# Patient Record
Sex: MALE | Employment: FULL TIME | URBAN - METROPOLITAN AREA
[De-identification: names, ages, dates, MRNs, and addresses within clinical notes are randomized per-mention and may not be internally consistent; named-entity substitution may affect disease eponyms.]

---

## 2022-03-08 ENCOUNTER — HOSPITAL ENCOUNTER (EMERGENCY)
Age: 67
Discharge: HOME | End: 2022-03-08
Attending: EMERGENCY MEDICINE
Payer: COMMERCIAL

## 2022-03-08 VITALS
DIASTOLIC BLOOD PRESSURE: 87 MMHG | RESPIRATION RATE: 17 BRPM | HEART RATE: 82 BPM | TEMPERATURE: 99.5 F | SYSTOLIC BLOOD PRESSURE: 140 MMHG | OXYGEN SATURATION: 97 %

## 2022-03-08 DIAGNOSIS — M10.072 ACUTE IDIOPATHIC GOUT OF LEFT FOOT: Primary | ICD-10-CM

## 2022-03-08 PROCEDURE — 99283 EMERGENCY DEPT VISIT LOW MDM: CPT

## 2022-03-08 RX ORDER — KETOROLAC TROMETHAMINE 30 MG/ML
30 INJECTION, SOLUTION INTRAMUSCULAR; INTRAVENOUS ONCE
Status: DISCONTINUED | OUTPATIENT
Start: 2022-03-08 | End: 2022-03-08 | Stop reason: HOSPADM

## 2022-03-08 RX ORDER — INDOMETHACIN 50 MG/1
50 CAPSULE ORAL 2 TIMES DAILY WITH MEALS
Qty: 14 CAPSULE | Refills: 0 | Status: SHIPPED | OUTPATIENT
Start: 2022-03-08

## 2022-03-08 ASSESSMENT — PAIN DESCRIPTION - ORIENTATION: ORIENTATION: LEFT

## 2022-03-08 ASSESSMENT — PAIN DESCRIPTION - LOCATION: LOCATION: KNEE;FOOT

## 2022-03-08 ASSESSMENT — ENCOUNTER SYMPTOMS
ABDOMINAL PAIN: 0
SHORTNESS OF BREATH: 0
APNEA: 0
VOICE CHANGE: 0
EYES NEGATIVE: 1
ABDOMINAL DISTENTION: 0
SORE THROAT: 0

## 2022-03-08 ASSESSMENT — PAIN - FUNCTIONAL ASSESSMENT: PAIN_FUNCTIONAL_ASSESSMENT: 0-10

## 2022-03-08 ASSESSMENT — PAIN SCALES - GENERAL: PAINLEVEL_OUTOF10: 5

## 2022-03-08 NOTE — ED PROVIDER NOTES
[facility]     Emergency Department     Faculty Attestation    I performed a history and physical examination of the patient and discussed management with the resident. I reviewed the residents note and agree with the documented findings and plan of care. Any areas of disagreement are noted on the chart. I was personally present for the key portions of any procedures. I have documented in the chart those procedures where I was not present during the key portions. I have reviewed the emergency nurses triage note. I agree with the chief complaint, past medical history, past surgical history, allergies, medications, social and family history as documented unless otherwise noted below. For Physician Assistant/ Nurse Practitioner cases/documentation I have personally evaluated this patient and have completed at least one if not all key elements of the E/M (history, physical exam, and MDM). Additional findings are as noted. I have personally seen and evaluated the patient. I find the patient's history and physical exam are consistent with the NP/PA documentation. I agree with the care provided, treatment rendered, disposition and follow-up plan. Patient reporting swelling of the left ankle and knee with a history of gout he does shortness swelling of the knee and foot both appear to be consistent with gout      Critical Care     Montrell Vargas M.D.   Attending Emergency  Physician              Ayan Mcmillan MD  03/08/22 2440

## 2022-03-08 NOTE — ED PROVIDER NOTES
[facility]  ED  Emergency Department Encounter  Emergency Medicine Resident     Pt Name: Todd Alaniz  LHN:[id number]  [place] 1955  Date of evaluation: 3/8/22  PCP:  No primary care provider on file. CHIEF COMPLAINT       Chief Complaint   Patient presents with    Foot Swelling     left since last week denies any injury    Joint Swelling     knee since saturday       HISTORY OF PRESENT ILLNESS  (Location/Symptom, Timing/Onset, Context/Setting, Quality, Duration, ModifyingFactors, Severity.)      Todd Alaniz is a 73 y.o. male with PMH of gouty flare presents emerge department for evaluation of left knee and foot swelling. Patient states that his left foot and swelling since last week in his knee since his last Saturday. Denies any injury. States that his does have a high gout flareup that he had approximately 3 years felt. Patient states that he does not have flares often and states that normally he does very well without having any other problems. States that he still able to ambulate and walk although it is painful and that his pain has been decently controlled with Motrin. PAST MEDICAL / SURGICAL / SOCIAL /FAMILY HISTORY      has no past medical history on file. No other pertinent PMH on review with patient/guardian. has no past surgical history on file. No other pertinent PSH on review with patient/guardian.   Social History     Socioeconomic History    Marital status:      Spouse name: Not on file    Number of children: Not on file    Years of education: Not on file    Highest education level: Not on file   Occupational History    Not on file   Tobacco Use    Smoking status: Not on file    Smokeless tobacco: Not on file   Substance and Sexual Activity    Alcohol use: Not on file    Drug use: Not on file    Sexual activity: Not on file   Other Topics Concern    Not on file   Social History Narrative    Not on file     Social Determinants of Health Financial Resource Strain:     Difficulty of Paying Living Expenses: Not on file   Food Insecurity:     Worried About Running Out of Food in the Last Year: Not on file    Viktor of Food in the Last Year: Not on file   Transportation Needs:     Lack of Transportation (Medical): Not on file    Lack of Transportation (Non-Medical): Not on file   Physical Activity:     Days of Exercise per Week: Not on file    Minutes of Exercise per Session: Not on file   Stress:     Feeling of Stress : Not on file   Social Connections:     Frequency of Communication with Friends and Family: Not on file    Frequency of Social Gatherings with Friends and Family: Not on file    Attends Oriental orthodox Services: Not on file    Active Member of [address] [place] [address] [employer] or Organizations: Not on file    Attends Club or Organization Meetings: Not on file    Marital Status: Not on file   Intimate Partner Violence:     Fear of Current or Ex-Partner: Not on file    Emotionally Abused: Not on file    Physically Abused: Not on file    Sexually Abused: Not on file   Housing Stability:     Unable to Pay for Housing in the Last Year: Not on file    Number of [place] in the Last Year: Not on file    Unstable Housing in the Last Year: Not on file       I counseled the patient against using tobacco products. No family history on file. No other pertinent FamHx on review with patient/guardian. Allergies:  Patient has no known allergies. Home Medications:  Prior to Admission medications    Medication Sig Start Date End Date Taking? Authorizing Provider   indomethacin (INDOCIN) 50 MG capsule Take 1 capsule by mouth 2 times daily (with meals) 3/8/22  Yes Keturah Manjarrez MD       REVIEW OF SYSTEMS    (2-9 systems for level 4, 10 ormore for level 5)      Review of Systems   Constitutional: Negative for activity change, appetite change and fatigue. HENT: Negative for congestion, sore throat and voice change. Eyes: Negative.     Respiratory: Negative for apnea and shortness of breath. Cardiovascular: Negative for chest pain. Gastrointestinal: Negative for abdominal distention and abdominal pain. Genitourinary: Negative for difficulty urinating and urgency. Musculoskeletal: Positive for joint swelling. Skin: Negative. Neurological: Negative. Psychiatric/Behavioral: Negative. PHYSICAL EXAM   (up to 7 for level 4, 8 or more for level 5)      INITIAL VITALS:   BP (!) 140/87   Pulse 82   Temp 99.5 °F (37.5 °C) (Oral)   Resp 17   SpO2 97%     Physical Exam  Constitutional:       Appearance: Normal appearance. HENT:      Head: Normocephalic and atraumatic. Nose: Nose normal.      Mouth/Throat:      Mouth: Mucous membranes are moist.      Pharynx: Oropharynx is clear. Eyes:      Extraocular Movements: Extraocular movements intact. Conjunctiva/sclera: Conjunctivae normal.      Pupils: Pupils are equal, round, and reactive to light. Cardiovascular:      Rate and Rhythm: Normal rate and regular rhythm. Pulses: Normal pulses. Heart sounds: Normal heart sounds. Musculoskeletal:      Cervical back: Normal range of motion and neck supple. Comments: Mildly swollen and tender left knee with no active area of erythema or point fluctuance. Significantly swollen foot especially on the medial side, mild point tenderness, no overlying cellulitis, erythema or warmth. Skin:     General: Skin is warm and dry. Capillary Refill: Capillary refill takes less than 2 seconds. Neurological:      General: No focal deficit present. Mental Status: He is alert. Mental status is at baseline. Psychiatric:         Mood and Affect: Mood normal.         Thought Content: Thought content normal.         DIFFERENTIAL  DIAGNOSIS     DDX: Acute gouty flare     PLAN (LABS / IMAGING / EKG):  No orders of the defined types were placed in this encounter.       MEDICATIONS ORDERED:  Orders Placed This Encounter   Medications Medicine  03/08/22 4:45 PM        (Please note that portions of this note were completed with a voice recognition program.Efforts were made to edit the dictations but occasionally words are mis-transcribed.)        Carol Valentino MD  Resident  03/08/22 8957

## 2023-03-16 ENCOUNTER — HOSPITAL ENCOUNTER (EMERGENCY)
Age: 68
Discharge: LEFT BEFORE DISCHARGE | End: 2023-03-16
Attending: EMERGENCY MEDICINE
Payer: COMMERCIAL

## 2023-03-16 ENCOUNTER — APPOINTMENT (OUTPATIENT)
Dept: GENERAL RADIOLOGY | Age: 68
End: 2023-03-16
Payer: COMMERCIAL

## 2023-03-16 VITALS
SYSTOLIC BLOOD PRESSURE: 155 MMHG | OXYGEN SATURATION: 97 % | HEART RATE: 89 BPM | WEIGHT: 240 LBS | DIASTOLIC BLOOD PRESSURE: 94 MMHG | TEMPERATURE: 98.4 F | RESPIRATION RATE: 16 BRPM

## 2023-03-16 DIAGNOSIS — M79.89 SWELLING OF RIGHT HAND: Primary | ICD-10-CM

## 2023-03-16 LAB
ABSOLUTE EOS #: 0.26 K/UL (ref 0–0.44)
ABSOLUTE IMMATURE GRANULOCYTE: 0.03 K/UL (ref 0–0.3)
ABSOLUTE LYMPH #: 1.88 K/UL (ref 1.1–3.7)
ABSOLUTE MONO #: 0.51 K/UL (ref 0.1–1.2)
ANION GAP SERPL CALCULATED.3IONS-SCNC: 7 MMOL/L (ref 9–17)
BASOPHILS # BLD: 0 % (ref 0–2)
BASOPHILS ABSOLUTE: 0.03 K/UL (ref 0–0.2)
BUN SERPL-MCNC: 9 MG/DL (ref 8–23)
CALCIUM SERPL-MCNC: 8.9 MG/DL (ref 8.6–10.4)
CHLORIDE SERPL-SCNC: 101 MMOL/L (ref 98–107)
CO2 SERPL-SCNC: 28 MMOL/L (ref 20–31)
CREAT SERPL-MCNC: 0.92 MG/DL (ref 0.7–1.2)
CRP SERPL HS-MCNC: 31.3 MG/L (ref 0–5)
EOSINOPHILS RELATIVE PERCENT: 3 % (ref 1–4)
ERYTHROCYTE [SEDIMENTATION RATE] IN BLOOD BY WESTERGREN METHOD: 48 MM/HR (ref 0–20)
GFR SERPL CREATININE-BSD FRML MDRD: >60 ML/MIN/1.73M2
GLUCOSE SERPL-MCNC: 123 MG/DL (ref 70–99)
HCT VFR BLD AUTO: 43.7 % (ref 40.7–50.3)
HGB BLD-MCNC: 14.7 G/DL (ref 13–17)
IMMATURE GRANULOCYTES: 0 %
LYMPHOCYTES # BLD: 18 % (ref 24–43)
MCH RBC QN AUTO: 31.1 PG (ref 25.2–33.5)
MCHC RBC AUTO-ENTMCNC: 33.6 G/DL (ref 28.4–34.8)
MCV RBC AUTO: 92.6 FL (ref 82.6–102.9)
MONOCYTES # BLD: 5 % (ref 3–12)
NRBC AUTOMATED: 0 PER 100 WBC
PDW BLD-RTO: 14.3 % (ref 11.8–14.4)
PLATELET # BLD AUTO: 291 K/UL (ref 138–453)
PMV BLD AUTO: 10.1 FL (ref 8.1–13.5)
POTASSIUM SERPL-SCNC: 4.5 MMOL/L (ref 3.7–5.3)
RBC # BLD: 4.72 M/UL (ref 4.21–5.77)
SEG NEUTROPHILS: 74 % (ref 36–65)
SEGMENTED NEUTROPHILS ABSOLUTE COUNT: 7.57 K/UL (ref 1.5–8.1)
SODIUM SERPL-SCNC: 136 MMOL/L (ref 135–144)
WBC # BLD AUTO: 10.3 K/UL (ref 3.5–11.3)

## 2023-03-16 PROCEDURE — 85025 COMPLETE CBC W/AUTO DIFF WBC: CPT

## 2023-03-16 PROCEDURE — 2580000003 HC RX 258: Performed by: STUDENT IN AN ORGANIZED HEALTH CARE EDUCATION/TRAINING PROGRAM

## 2023-03-16 PROCEDURE — 6360000002 HC RX W HCPCS: Performed by: STUDENT IN AN ORGANIZED HEALTH CARE EDUCATION/TRAINING PROGRAM

## 2023-03-16 PROCEDURE — 6370000000 HC RX 637 (ALT 250 FOR IP): Performed by: STUDENT IN AN ORGANIZED HEALTH CARE EDUCATION/TRAINING PROGRAM

## 2023-03-16 PROCEDURE — 86140 C-REACTIVE PROTEIN: CPT

## 2023-03-16 PROCEDURE — 99284 EMERGENCY DEPT VISIT MOD MDM: CPT

## 2023-03-16 PROCEDURE — 80048 BASIC METABOLIC PNL TOTAL CA: CPT

## 2023-03-16 PROCEDURE — 73130 X-RAY EXAM OF HAND: CPT

## 2023-03-16 PROCEDURE — 85652 RBC SED RATE AUTOMATED: CPT

## 2023-03-16 PROCEDURE — 96365 THER/PROPH/DIAG IV INF INIT: CPT

## 2023-03-16 PROCEDURE — 84550 ASSAY OF BLOOD/URIC ACID: CPT

## 2023-03-16 RX ORDER — INDOMETHACIN 25 MG/1
25 CAPSULE ORAL ONCE
Status: COMPLETED | OUTPATIENT
Start: 2023-03-16 | End: 2023-03-16

## 2023-03-16 RX ADMIN — INDOMETHACIN 25 MG: 25 CAPSULE ORAL at 21:23

## 2023-03-16 RX ADMIN — SODIUM CHLORIDE 3000 MG: 900 INJECTION INTRAVENOUS at 21:26

## 2023-03-16 ASSESSMENT — ENCOUNTER SYMPTOMS
ABDOMINAL PAIN: 0
SHORTNESS OF BREATH: 0
EYE REDNESS: 0
NAUSEA: 0
DIARRHEA: 0
RHINORRHEA: 0
VOMITING: 0

## 2023-03-16 ASSESSMENT — PAIN DESCRIPTION - LOCATION: LOCATION: HAND

## 2023-03-16 ASSESSMENT — PAIN DESCRIPTION - DESCRIPTORS: DESCRIPTORS: DISCOMFORT;ACHING

## 2023-03-16 ASSESSMENT — PAIN SCALES - GENERAL: PAINLEVEL_OUTOF10: 5

## 2023-03-16 NOTE — ED PROVIDER NOTES
[facility]     Emergency Department     Faculty Note/ Attestation      Pt Name: Minna Mitchell                                       MRN: [id number]  [place] 1955  Date of evaluation: 3/16/2023    Patients PCP:    Husam Sims  I performed a history and physical examination of the patient and discussed management with the resident. I reviewed the residents note and agree with the documented findings and plan of care. Any areas of disagreement are noted on the chart. I was personally present for the key portions of any procedures. I have documented in the chart those procedures where I was not present during the key portions. I have reviewed the emergency nurses triage note. I agree with the chief complaint, past medical history, past surgical history, allergies, medications, social and family history as documented unless otherwise noted below. For Physician Assistant/ Nurse Practitioner cases/documentation I have personally evaluated this patient and have completed at least one if not all key elements of the E/M (history, physical exam, and MDM). Additional findings are as noted. Initial Screens:        [place] Coma Scale  Eye Opening: Spontaneous  Best Verbal Response: Oriented  Best Motor Response: Obeys commands  [place] Coma Scale Score: 15    Vitals:    Vitals:    03/16/23 1839 03/16/23 1840   BP:  (!) 155/94   Pulse: 89    Resp: 16    Temp: 98.4 °F (36.9 °C)    TempSrc: Oral    SpO2: 97%    Weight: 240 lb (108.9 kg)        CHIEF COMPLAINT       Chief Complaint   Patient presents with    Hand Injury     Swollen right hand x 1 week    Joint Swelling       The pt is a 79 YO M with 1 week of swelling in right elbow and now down into the hand. The pt notes it has been worse in the pointer finger and able to move extremities but the pt has no trauma no bites or injuries. Tries ibuprofen but no prior history other than gout but no medications for this. EMERGENCY DEPARTMENT COURSE:     -------------------------  BP: (!) 155/94, Temp: 98.4 °F (36.9 °C), Heart Rate: 89, Resp: 16  Patient has entire right hand swollen good range of motion of all digits with the exception of the pointer finger patient has significant pain along the flexor tendon patient has difficulty with motion with flexion slightly limited extension range of motion primarily flexion range of motion is limited slight increase in warmth of the hand    Comments  Medical Decision Making  Amount and/or Complexity of Data Reviewed  Labs: ordered. Decision-making details documented in ED Course. Radiology: ordered. Decision-making details documented in ED Course. Risk  Prescription drug management. X-ray will be obtained for possible fracture or injury however more concerning in this patient is a flexor tenosynovitis or infection of the hand patient will have labs and hand surgery consult      ED Course as of 03/17/23 0055   Thu Mar 16, 2023   2030 XR HAND RIGHT (MIN 3 VIEWS)  Soft tissue swelling but no bony involvement [CP]   2042 Sed Rate(!): 48 [CP]   2042 WBC: 10.3  No leukocytosis [CP]   2102 Spoke with Baltazar from Plastic surgery who recommended- Unison 3g iv q6, starting treatment for gout  [CP]   2110 Updated the patient on concerns for flexor tenosynovitis versus an gout and recommendations by plastic surgery for admission for IV antibiotics as well as to begin treatment for gout flareup while pending work-up. Patient states that he has some external fears that he needs to \"sort out\" and states that he cannot stay for admission at this time because he cannot leave his wife. Patient states he is worried about the security of his house and does not go further into detail. Did offer several options including assisting wife with transport back home versus potentially allowing wife to stay overnight with patient.   Patient states that it still does not help solve his security issues. Patient states that he would be agreeable to return to the emergency department. Did discuss risk of leaving [address] including progression of possible infection, worsening of symptoms, loss of life or limb. Patient is alert and oriented and does have decision-making capacity. He does express understanding of concerns of both myself and the consulting physicians. Patient states that he is agreeable to stay for 1 dose of IV antibiotics prior to leaving [facility]MA and plans to return tomorrow morning to the emergency department for reevaluation. [CP]   7512 Also notified Dr. Tuyet Savage of patient's decision to leave [facility] [CP]      ED Course User Index  [CP] Evelyne Cogan, MD Letha Brush, DO,, DO, RDMS.   Attending Emergency Physician          Christiane Wild DO  03/17/23 4422

## 2023-03-16 NOTE — ED PROVIDER NOTES
[facility]  ED  Emergency Department Encounter  Emergency Medicine Resident     Pt Name:Karl Garcia  MRN: [id number]  [place] 1955  Date of evaluation: 3/16/23  PCP:  Rada Boxer  Note Started: 7:13 PM EDT    CHIEF COMPLAINT       Chief Complaint   Patient presents with    Hand Injury     Swollen right hand x 1 week    Joint Swelling     HISTORY OF PRESENT ILLNESS  (Location/Symptom, Timing/Onset, Context/Setting, Quality, Duration, Modifying Factors, Severity.)      Marikay Gaucher is a 78 y.o. male who presents with right hand swelling since Friday 3/10/2023. No specific injury. States that he did have some swelling in the right elbow earlier this week, but this has since resolved. He has tried ibuprofen without improvement. He also took a dose of a left over antibiotic from wife without improvement yesterday. Patient does have remote history of gout, but states he only had one episode and hasn't been on medication for years. He denies any fever, chills. PAST MEDICAL / SURGICAL / SOCIAL / FAMILY HISTORY      has no past medical history on file. has no past surgical history on file.       Social History     Socioeconomic History    Marital status:      Spouse name: Not on file    Number of children: Not on file    Years of education: Not on file    Highest education level: Not on file   Occupational History    Not on file   Tobacco Use    Smoking status: Every Day     Types: Cigarettes    Smokeless tobacco: Never   Substance and Sexual Activity    Alcohol use: Not Currently    Drug use: Never    Sexual activity: Yes     Partners: Female   Other Topics Concern    Not on file   Social History Narrative    Not on file     Social Determinants of Health     Financial Resource Strain: Not on file   Food Insecurity: Not on file   Transportation Needs: Not on file   Physical Activity: Not on file   Stress: Not on file   Social Connections: Not on file   Intimate Partner Violence: Not on file   Housing Stability: Not on file     History reviewed. No pertinent family history. Allergies:  Patient has no known allergies. Home Medications:  Prior to Admission medications    Medication Sig Start Date End Date Taking? Authorizing Provider   indomethacin (INDOCIN) 50 MG capsule Take 1 capsule by mouth 2 times daily (with meals) 3/8/22   Zahira Lara MD     REVIEW OF SYSTEMS       Review of Systems   Constitutional:  Negative for fever. HENT:  Negative for congestion and rhinorrhea. Eyes:  Negative for redness. Respiratory:  Negative for shortness of breath. Cardiovascular:  Negative for chest pain. Gastrointestinal:  Negative for abdominal pain, diarrhea, nausea and vomiting. Genitourinary:  Negative for dysuria. Musculoskeletal:  Positive for arthralgias, joint swelling and myalgias. Skin:  Negative for wound. Neurological:  Negative for headaches. PHYSICAL EXAM      INITIAL VITALS:   BP (!) 155/94   Pulse 89   Temp 98.4 °F (36.9 °C) (Oral)   Resp 16   Wt 240 lb (108.9 kg)   SpO2 97%     Physical Exam  Constitutional:       General: He is not in acute distress. Appearance: Normal appearance. HENT:      Head: Normocephalic and atraumatic. Nose: Nose normal.      Mouth/Throat:      Mouth: Mucous membranes are moist.   Eyes:      Extraocular Movements: Extraocular movements intact. Pupils: Pupils are equal, round, and reactive to light. Cardiovascular:      Rate and Rhythm: Normal rate and regular rhythm. Pulses: Normal pulses. Heart sounds: Normal heart sounds. No murmur heard. Pulmonary:      Effort: Pulmonary effort is normal. No respiratory distress. Breath sounds: Normal breath sounds. No wheezing. Abdominal:      General: There is no distension. Palpations: Abdomen is soft. Tenderness: There is no abdominal tenderness. There is no guarding or rebound.    Musculoskeletal:      Cervical back: Normal range of motion. Right lower leg: No edema. Left lower leg: No edema. Comments: With extensive edema noted to the dorsal aspect of the right hand. There is also some minimal swelling over the MCP of the index and middle finger on the right hand on the palmar surface. Patient unable to perform range of motion with the right index finger however passive range of motion remains somewhat intact. The right hand is somewhat more warm compared to the left hand. Slight erythema noted over the right hand as well. Neurological:      General: No focal deficit present. Mental Status: He is alert and oriented to person, place, and time. DDX/DIAGNOSTIC RESULTS / EMERGENCY DEPARTMENT COURSE / MDM     Medical Decision Making  This is a 60-year-old male with worsening right hand pain and swelling since Friday of last week. The right hand is markedly swollen, mildly erythematous and with some slight increased warmth compared to the left hand. Unable to perform range of motion with the right index finger. There is concern for flexor tenosynovitis versus acute gout pathology. Plan for x-ray imaging. Consider lab work to assess for inflammatory markers and leukocytosis. Patient may benefit from plastic/hand surgery evaluation. Consider admission pending work-up and consultant recommendations. Amount and/or Complexity of Data Reviewed  Labs: ordered. Decision-making details documented in ED Course. Radiology: ordered. Decision-making details documented in ED Course. Risk  Prescription drug management. Decision regarding hospitalization.       EKG  Not indicated    All EKG's are interpreted by the Emergency Department Physician who either signs or Co-signs this chart in the absence of a cardiologist.    EMERGENCY DEPARTMENT COURSE:    ED Course as of 03/16/23 2343   Thu Mar 16, 2023   2030 XR HAND RIGHT (MIN 3 VIEWS)  Soft tissue swelling but no bony involvement [CP]   2042 Sed Rate(!): 48 [CP] 2042 WBC: 10.3  No leukocytosis [CP]   2102 Spoke with Baltazar from Plastic surgery who recommended- Unison 3g iv q6, starting treatment for gout  [CP]   2110 Updated the patient on concerns for flexor tenosynovitis versus an gout and recommendations by plastic surgery for admission for IV antibiotics as well as to begin treatment for gout flareup while pending work-up. Patient states that he has some external fears that he needs to \"sort out\" and states that he cannot stay for admission at this time because he cannot leave his wife. Patient states he is worried about the security of his house and does not go further into detail. Did offer several options including assisting wife with transport back home versus potentially allowing wife to stay overnight with patient. Patient states that it still does not help solve his security issues. Patient states that he would be agreeable to return to the emergency department. Did discuss risk of leaving [address] including progression of possible infection, worsening of symptoms, loss of life or limb. Patient is alert and oriented and does have decision-making capacity. He does express understanding of concerns of both myself and the consulting physicians. Patient states that he is agreeable to stay for 1 dose of IV antibiotics prior to leaving [place] and plans to return tomorrow morning to the emergency department for reevaluation. [CP]   2143 Also notified Dr. Rachael Ga of patient's decision to leave [facility] [CP]      ED Course User Index  [CP] Elbert Oviedo MD     PROCEDURES:  none    CONSULTS:  IP CONSULT TO PLASTIC SURGERY    CRITICAL CARE:  There was significant risk of life threatening deterioration of patient's condition requiring my direct management. Critical care time 10 minutes, excluding any documented procedures. FINAL IMPRESSION      1.  Swelling of right hand        DISPOSITION / PLAN     DISPOSITION [place] 03/16/2023 09:49:43 PM    PATIENT REFERRED TO:  [facility] ED  [address] [facility] [address]  [phone]  Go to   for hand surgery evaluation    DISCHARGE MEDICATIONS:  Discharge Medication List as of 3/16/2023  9:53 Kisha Dean MD  Emergency Medicine Resident    (Please note that portions of thisnote were completed with a voice recognition program.  Efforts were made to edit the dictations but occasionally words are mis-transcribed.)       Mylinda Dakins, MD  Resident  03/16/23 6018

## 2023-03-16 NOTE — ED TRIAGE NOTES
Pt states 1 week ago his right elbow started to swell up,   Since last Friday his hand started to swell up     Having trouble with holding objects , making a fist     History of gout   Denies any recent falls or trauma to the rigth elbow and hand area

## 2023-03-17 LAB — URATE SERPL-MCNC: 8.1 MG/DL (ref 3.4–7)

## 2023-03-17 NOTE — DISCHARGE INSTRUCTIONS
You were evaluated today in the emergency department for right hand swelling. There is some concern for potential infection of the hand which left untreated could lead to permanent loss of limb and/or loss of life. It was recommended that you be admitted to the hospital today for further treatment and evaluation by the hand surgery team.  You are choosing to leave the emergency department [address] You did receive 1 dose of antibiotic prior to leaving [address] This is not satisfactory treatment of the hand swelling and further work-up and treatment is needed. It is recommended that you return to the emergency department for reevaluation as soon as possible and for hand/plastic surgery to evaluate the hand swelling and to continue treatment.

## 2024-02-20 ENCOUNTER — HOSPITAL ENCOUNTER (EMERGENCY)
Age: 69
Discharge: HOME | End: 2024-02-20
Attending: EMERGENCY MEDICINE
Payer: COMMERCIAL

## 2024-02-20 ENCOUNTER — APPOINTMENT (OUTPATIENT)
Dept: GENERAL RADIOLOGY | Age: 69
End: 2024-02-20
Payer: COMMERCIAL

## 2024-02-20 VITALS
SYSTOLIC BLOOD PRESSURE: 141 MMHG | DIASTOLIC BLOOD PRESSURE: 103 MMHG | TEMPERATURE: 98.3 F | OXYGEN SATURATION: 97 % | BODY MASS INDEX: 34.1 KG/M2 | WEIGHT: 225 LBS | HEIGHT: 68 IN | HEART RATE: 95 BPM | RESPIRATION RATE: 20 BRPM

## 2024-02-20 DIAGNOSIS — L03.116 CELLULITIS OF LEFT LOWER EXTREMITY: ICD-10-CM

## 2024-02-20 DIAGNOSIS — M10.9 ACUTE GOUT OF LEFT FOOT, UNSPECIFIED CAUSE: Primary | ICD-10-CM

## 2024-02-20 LAB
ANION GAP SERPL CALCULATED.3IONS-SCNC: 8 MMOL/L (ref 9–17)
BASOPHILS # BLD: 0.03 K/UL (ref 0–0.2)
BASOPHILS NFR BLD: 0 % (ref 0–2)
BUN SERPL-MCNC: 10 MG/DL (ref 8–23)
CALCIUM SERPL-MCNC: 8.7 MG/DL (ref 8.6–10.4)
CHLORIDE SERPL-SCNC: 103 MMOL/L (ref 98–107)
CO2 SERPL-SCNC: 25 MMOL/L (ref 20–31)
CREAT SERPL-MCNC: 0.8 MG/DL (ref 0.7–1.2)
CRP SERPL HS-MCNC: 142.2 MG/L (ref 0–5)
EOSINOPHIL # BLD: 0.21 K/UL (ref 0–0.44)
EOSINOPHILS RELATIVE PERCENT: 2 % (ref 1–4)
ERYTHROCYTE [DISTWIDTH] IN BLOOD BY AUTOMATED COUNT: 15 % (ref 11.8–14.4)
ERYTHROCYTE [SEDIMENTATION RATE] IN BLOOD BY PHOTOMETRIC METHOD: 60 MM/HR (ref 0–20)
GFR SERPL CREATININE-BSD FRML MDRD: >60 ML/MIN/1.73M2
GLUCOSE SERPL-MCNC: 91 MG/DL (ref 70–99)
HCT VFR BLD AUTO: 45.2 % (ref 40.7–50.3)
HGB BLD-MCNC: 14.8 G/DL (ref 13–17)
IMM GRANULOCYTES # BLD AUTO: 0.05 K/UL (ref 0–0.3)
IMM GRANULOCYTES NFR BLD: 1 %
LYMPHOCYTES NFR BLD: 1.32 K/UL (ref 1.1–3.7)
LYMPHOCYTES RELATIVE PERCENT: 13 % (ref 24–43)
MCH RBC QN AUTO: 31.2 PG (ref 25.2–33.5)
MCHC RBC AUTO-ENTMCNC: 32.7 G/DL (ref 28.4–34.8)
MCV RBC AUTO: 95.4 FL (ref 82.6–102.9)
MONOCYTES NFR BLD: 0.77 K/UL (ref 0.1–1.2)
MONOCYTES NFR BLD: 7 % (ref 3–12)
NEUTROPHILS NFR BLD: 77 % (ref 36–65)
NEUTS SEG NFR BLD: 8.17 K/UL (ref 1.5–8.1)
NRBC BLD-RTO: 0 PER 100 WBC
PLATELET # BLD AUTO: 218 K/UL (ref 138–453)
PMV BLD AUTO: 10 FL (ref 8.1–13.5)
POTASSIUM SERPL-SCNC: 3.9 MMOL/L (ref 3.7–5.3)
RBC # BLD AUTO: 4.74 M/UL (ref 4.21–5.77)
RBC # BLD: ABNORMAL 10*6/UL
SODIUM SERPL-SCNC: 136 MMOL/L (ref 135–144)
WBC OTHER # BLD: 10.6 K/UL (ref 3.5–11.3)

## 2024-02-20 PROCEDURE — 85652 RBC SED RATE AUTOMATED: CPT

## 2024-02-20 PROCEDURE — 85025 COMPLETE CBC W/AUTO DIFF WBC: CPT

## 2024-02-20 PROCEDURE — 80048 BASIC METABOLIC PNL TOTAL CA: CPT

## 2024-02-20 PROCEDURE — 86140 C-REACTIVE PROTEIN: CPT

## 2024-02-20 PROCEDURE — 99284 EMERGENCY DEPT VISIT MOD MDM: CPT

## 2024-02-20 PROCEDURE — 73630 X-RAY EXAM OF FOOT: CPT

## 2024-02-20 PROCEDURE — 6370000000 HC RX 637 (ALT 250 FOR IP)

## 2024-02-20 RX ORDER — COLCHICINE 0.6 MG/1
0.6 TABLET ORAL ONCE
Status: COMPLETED | OUTPATIENT
Start: 2024-02-20 | End: 2024-02-20

## 2024-02-20 RX ORDER — CEPHALEXIN 500 MG/1
500 CAPSULE ORAL ONCE
Status: COMPLETED | OUTPATIENT
Start: 2024-02-20 | End: 2024-02-20

## 2024-02-20 RX ORDER — INDOMETHACIN 50 MG/1
50 CAPSULE ORAL
Qty: 21 CAPSULE | Refills: 0 | Status: SHIPPED | OUTPATIENT
Start: 2024-02-20

## 2024-02-20 RX ORDER — CEPHALEXIN 500 MG/1
500 CAPSULE ORAL 4 TIMES DAILY
Qty: 28 CAPSULE | Refills: 0 | Status: SHIPPED | OUTPATIENT
Start: 2024-02-20 | End: 2024-02-27

## 2024-02-20 RX ORDER — INDOMETHACIN 50 MG/1
50 CAPSULE ORAL ONCE
Status: COMPLETED | OUTPATIENT
Start: 2024-02-20 | End: 2024-02-20

## 2024-02-20 RX ORDER — COLCHICINE 0.6 MG/1
0.6 TABLET ORAL DAILY
Qty: 30 TABLET | Refills: 0 | Status: SHIPPED | OUTPATIENT
Start: 2024-02-20

## 2024-02-20 RX ADMIN — CEPHALEXIN 500 MG: 500 CAPSULE ORAL at 19:38

## 2024-02-20 RX ADMIN — COLCHICINE 0.6 MG: 0.6 TABLET, FILM COATED ORAL at 19:38

## 2024-02-20 RX ADMIN — INDOMETHACIN 50 MG: 50 CAPSULE ORAL at 19:52

## 2024-02-20 ASSESSMENT — PAIN DESCRIPTION - ORIENTATION: ORIENTATION: LEFT

## 2024-02-20 ASSESSMENT — PAIN - FUNCTIONAL ASSESSMENT: PAIN_FUNCTIONAL_ASSESSMENT: 0-10

## 2024-02-20 ASSESSMENT — PAIN DESCRIPTION - LOCATION: LOCATION: FOOT

## 2024-02-20 ASSESSMENT — PAIN SCALES - GENERAL: PAINLEVEL_OUTOF10: 9

## 2024-02-20 NOTE — ED NOTES
The following labs were labeled with appropriate pt sticker and tubed to lab:     [] Blue     [x] Lavender   [] on ice  [x] Green/yellow  [] Green/black [] on ice  [] Grey  [] on ice  [] Yellow  [] Red  [] Pink  [] Type/ Screen  [] ABG  [] VBG    [] COVID-19 swab    [] Rapid  [] PCR  [] Flu swab  [] Peds Viral Panel     [] Urine Sample  [] Fecal Sample  [] Pelvic Cultures  [] Blood Cultures  [] X 2  [] STREP Cultures  [] Wound Cultures

## 2024-02-20 NOTE — ED PROVIDER NOTES
This patient was signed out to me by  at the completion of his shift.  This patient presented to the emergency department with complaints of pain in the right foot.  There is no history of trauma.  Evaluation does demonstrate swelling and erythema over the dorsal aspect of the foot extending over the anterior aspect of the distal leg with some warmth but no crepitus.  Patient does have a probable history of gout in the past.  Concern for infection versus gouty arthropathy.  Workup is ongoing.  Disposition is yet to be determined.     Candelario Drake MD  02/20/24 5969    On examination the patient demonstrates significant edema and erythema over the dorsal aspect of the foot extending onto the distal aspect of the anterior leg.  There is some modest tenderness but that appears to be primarily over the anterior aspect of the ankle and the distal leg.  No lymphangitis.  No lymphadenopathy noted.  Impression: Cellulitis versus gouty tenosynovitis/arthropathy.  Plan: Given that we are unable to discern which of those 2 entities are present we will initiate treatment for both.  Patient will be given crutches and advised to be nonweightbearing or to be minimally weightbearing.  He is asked to return to the emergency department tomorrow afternoon to be reevaluated by Dr. Ladd and of course to return sooner should his symptoms worsen or progress.  We will provide the initial doses of his medications tonight and prescriptions for the remainder that he can fill in the morning.     Candelario Drake MD  02/20/24 1308       Candelario Drake MD  02/25/24 1457

## 2024-02-20 NOTE — ED NOTES
Pt is A+Ox4  Pt complains of foot pain   Pt denies any trauma to th foot  Family at the bedside  All questions answered and needs met

## 2024-02-20 NOTE — ED PROVIDER NOTES
[facility] ED     Emergency Department     Faculty Attestation        I performed a history and physical examination of the patient and discussed management with the resident. I reviewed the resident’s note and agree with the documented findings and plan of care. Any areas of disagreement are noted on the chart. I was personally present for the key portions of any procedures. I have documented in the chart those procedures where I was not present during the key portions. I have reviewed the emergency nurses triage note. I agree with the chief complaint, past medical history, past surgical history, allergies, medications, social and family history as documented unless otherwise noted below.  For Physician Assistant/ Nurse Practitioner cases/documentation I have personally evaluated this patient and have completed at least one if not all key elements of the E/M (history, physical exam, and MDM). Additional findings are as noted.      Vital Signs: BP: (!) 141/103  Pulse: 95  Respirations: 20  Temp: 98.3 °F (36.8 °C) SpO2: 97 %  PCP:  Husam Cross  Note Started: 2/20/24, 5:43 PM EST    Pertinent Comments:     Patient is a 68-year-old male with history of possible gout in the past however not formally diagnosed.   Patient states he has had pain in his right forefoot for the last few days and is here for evaluation.   On exam is neurovascular intact with strong DP pulse palpated and capillary refill brisk and less than 2 seconds.   Unfortunately, there is erythema across the dorsum of the right foot as well as going up the initial portion of his shin.   There is warmth associated but no crepitance or pain out of proportion to examination.   No posterior calf pain or chest pain/shortness of breath and no abdominal pain either.   Assessment/plan: Patient may have gout but concern regarding cellulitis given exam of the right forefoot and right shin.   Will

## 2024-02-21 ENCOUNTER — HOSPITAL ENCOUNTER (EMERGENCY)
Age: 69
Discharge: HOME | End: 2024-02-21
Attending: EMERGENCY MEDICINE
Payer: COMMERCIAL

## 2024-02-21 VITALS
TEMPERATURE: 97.6 F | RESPIRATION RATE: 17 BRPM | HEART RATE: 68 BPM | BODY MASS INDEX: 34.28 KG/M2 | SYSTOLIC BLOOD PRESSURE: 143 MMHG | OXYGEN SATURATION: 100 % | WEIGHT: 226.19 LBS | DIASTOLIC BLOOD PRESSURE: 78 MMHG | HEIGHT: 68 IN

## 2024-02-21 DIAGNOSIS — M10.9 GOUT OF LEFT FOOT, UNSPECIFIED CAUSE, UNSPECIFIED CHRONICITY: ICD-10-CM

## 2024-02-21 DIAGNOSIS — L03.116 LEFT LEG CELLULITIS: Primary | ICD-10-CM

## 2024-02-21 PROCEDURE — 99282 EMERGENCY DEPT VISIT SF MDM: CPT | Performed by: EMERGENCY MEDICINE

## 2024-02-21 ASSESSMENT — PAIN DESCRIPTION - LOCATION: LOCATION: ANKLE;LEG;FOOT

## 2024-02-21 ASSESSMENT — PAIN SCALES - GENERAL: PAINLEVEL_OUTOF10: 9

## 2024-02-21 ASSESSMENT — PAIN - FUNCTIONAL ASSESSMENT: PAIN_FUNCTIONAL_ASSESSMENT: 0-10

## 2024-02-21 ASSESSMENT — PAIN DESCRIPTION - DESCRIPTORS: DESCRIPTORS: ACHING;PINS AND NEEDLES

## 2024-02-21 ASSESSMENT — PAIN DESCRIPTION - PAIN TYPE: TYPE: ACUTE PAIN

## 2024-02-21 ASSESSMENT — PAIN DESCRIPTION - FREQUENCY: FREQUENCY: CONTINUOUS

## 2024-02-21 ASSESSMENT — PAIN DESCRIPTION - ORIENTATION: ORIENTATION: LEFT

## 2024-02-21 NOTE — ED PROVIDER NOTES
[facility] ED  Emergency Department Encounter  Emergency Medicine Resident     Pt Name:Karl Castro  MRN: [id number]  Birthdate 1955  Date of evaluation: 2/20/24  PCP:  Husam Cross  Note Started: 7:33 PM EST      CHIEF COMPLAINT       Chief Complaint   Patient presents with    Foot Pain     left       HISTORY OF PRESENT ILLNESS  (Location/Symptom, Timing/Onset, Context/Setting, Quality, Duration, Modifying Factors, Severity.)      Karl Castro is a 68 y.o. male who presents with left foot pain.  Patient reports that first noticed this Friday afternoon.  Reports difficulty with ambulation with improvement since.  Patient reports that this pain first started in the left big toe and has spread up more towards the ankle.  Reports taking ibuprofen with some relief and also reports taking his pastors gout medication.  Patient reports history of gout like symptoms although reports he has never been diagnosed.    Patient denies fever, nausea, vomiting, lightheadedness, dizziness, chest pain, shortness of breath, numbness, tingling, urinary or bowel complaints.    PAST MEDICAL / SURGICAL / SOCIAL / FAMILY HISTORY      has no past medical history on file.       has no past surgical history on file.      Social History     Socioeconomic History    Marital status:      Spouse name: Not on file    Number of children: Not on file    Years of education: Not on file    Highest education level: Not on file   Occupational History    Not on file   Tobacco Use    Smoking status: Every Day     Types: Cigarettes    Smokeless tobacco: Never   Substance and Sexual Activity    Alcohol use: Not Currently    Drug use: Never    Sexual activity: Yes     Partners: Female   Other Topics Concern    Not on file   Social History Narrative    Not on file     Social Determinants of Health     Financial Resource Strain: Not on file   Food Insecurity: Not on file   Transportation Needs: Not on file   Physical

## 2024-02-21 NOTE — ED NOTES
Pt is A+Ox4  Pt states he was told to come back to the ER to have left foot checked compared to yesterday  Pt ambulates with crutches  Pt states he has taken medication as prescribed from the previous visit to the er  All questions answered and needs met at this time

## 2024-02-21 NOTE — DISCHARGE INSTRUCTIONS
You were seen and reevaluated at [facility] on 2/21/2024 for reevaluation of left lower leg.  The redness appears to be improved.  Please continue to take the Keflex antibiotics twice a day for the duration of the antibiotic.  Please continue to take colchicine, 1 tablet in the morning.  Please continue to take indomethacin 3 times a day with food.  Attached is contact information for your listed primary care provider.  Please call to schedule an appointment today or tomorrow.  If you do not follow with this provider, attached is contact information for new PCP.  Please call to schedule an appointment.  Please do not take any other NSAIDs including Motrin when taking the indomethacin.     Please return to the ED with any new or worsening symptoms including inability to ambulate, color changes, fever, nausea, vomiting, any other concerns.

## 2024-02-21 NOTE — ED PROVIDER NOTES
[facility]     Emergency Department     Faculty Attestation    I performed a history and physical examination of the patient and discussed management with the resident. I reviewed the resident’s note and agree with the documented findings including all diagnostic interpretations and plan of care. Any areas of disagreement are noted on the chart. I was personally present for the key portions of any procedures. I have documented in the chart those procedures where I was not present during the key portions. I have reviewed the emergency nurses triage note. I agree with the chief complaint, past medical history, past surgical history, allergies, medications, social and family history as documented unless otherwise noted below. Documentation of the HPI, Physical Exam and Medical Decision Making performed by robert is based on my personal performance of the HPI, PE and MDM. For Physician Assistant/ Nurse Practitioner cases/documentation I have personally evaluated this patient and have completed at least one if not all key elements of the E/M (history, physical exam, and MDM). Additional findings are as noted.    Primary Care Physician: Husam Cross    Note Started: 4:02 PM EST     VITAL SIGNS:   height is 1.727 m (5' 8\") and weight is 102.6 kg (226 lb 3.1 oz). His temperature is 97.6 °F (36.4 °C). His blood pressure is 143/78 (abnormal) and his pulse is 68. His respiration is 17 and oxygen saturation is 100%.      Medical Decision Making  Leg recheck.  Patient was seen here yesterday and was prescribed medications for gout as well as cellulitis given the extended erythema.  Was instructed to return for reevaluation.  He reports pain is similar but has noticed that the redness has decreased compared with yesterday.  On examination does have some edema through the ankle with some mild erythema and warmth.  The skin markings made yesterday to show that the  erythema has receded somewhat.  Impression ankle pain with swelling, gout versus cellulitis with subsequent presentation, showing improvement with medication.  Plan is continue medications and return if any worsening of symptoms.    Amount and/or Complexity of Data Reviewed  External Data Reviewed: notes.        Russell Evans MD, FACEP, FAAEM  Attending Emergency Physician        Russell Evans MD  02/21/24 [id number]

## 2024-02-21 NOTE — ED PROVIDER NOTES
[facility] ED  Emergency Department Encounter  Emergency Medicine Resident     Pt Name:Karl Castro  MRN: [id number]  Birthdate 1955  Date of evaluation: 2/21/24  PCP:  Husam Cross  Note Started: 3:17 PM EST      CHIEF COMPLAINT       Chief Complaint   Patient presents with    Leg Swelling    Foot Pain       HISTORY OF PRESENT ILLNESS  (Location/Symptom, Timing/Onset, Context/Setting, Quality, Duration, Modifying Factors, Severity.)      Karl Castro is a 68 y.o. male who presents for reevaluation of left leg.  I saw this patient yesterday and patient does likely have gout but was being treated for possible cellulitis as well.  Patient reports some improvement in redness and swelling however still endorsing pain around the ankle.  Denies any falls or trauma since being seen last.  Denies fever, nausea, vomiting, chest pain, shortness of breath, lightheadedness, dizziness, urinary or bowel complaints.    PAST MEDICAL / SURGICAL / SOCIAL / FAMILY HISTORY      has no past medical history on file.       has no past surgical history on file.      Social History     Socioeconomic History    Marital status:      Spouse name: Not on file    Number of children: Not on file    Years of education: Not on file    Highest education level: Not on file   Occupational History    Not on file   Tobacco Use    Smoking status: Every Day     Types: Cigarettes    Smokeless tobacco: Never   Substance and Sexual Activity    Alcohol use: Not Currently    Drug use: Never    Sexual activity: Yes     Partners: Female   Other Topics Concern    Not on file   Social History Narrative    Not on file     Social Determinants of Health     Financial Resource Strain: Not on file   Food Insecurity: Not on file   Transportation Needs: Not on file   Physical Activity: Not on file   Stress: Not on file   Social Connections: Not on file   Intimate Partner Violence: Not on file   Housing Stability: Not on file  necessary.  Advised on continuing his medication for cellulitis as well as gout flareup and provided with contact information for PCP and recommended close follow-up today or tomorrow as well as return precautions.  Patient is agreeable with plan.            FINAL IMPRESSION      1. Left leg cellulitis    2. Gout of left foot, unspecified cause, unspecified chronicity          DISPOSITION / PLAN     DISPOSITION Decision To Discharge 02/21/2024 03:47:21 PM      PATIENT REFERRED TO:  Husam Cross  [address]  [facility] [id number] [phone]          [address] [address]  [phone]          DISCHARGE MEDICATIONS:  New Prescriptions    No medications on file       Vincent Ladd DO  Emergency Medicine Resident    (Please note that portions of thisnote were completed with a voice recognition program.  Efforts were made to edit the dictations but occasionally words are mis-transcribed.)

## 2024-02-21 NOTE — DISCHARGE INSTRUCTIONS
You were seen and evaluated at [facility] on 2/20/2024 for left foot pain going on for the last few days.  X-ray was done which showed soft tissue swelling and no acute fractures.  With the soft tissue swelling, warmth concerns for cellulitis versus gout attack.  You are prescribed medications for both and received a dose here in the ED.  You will take an antibiotic called Keflex 4 times a day for 7 days.  You will take a medication called colchicine of 1 times a day in the morning.  Another medication prescribed was indomethacin which she will take 3 times a day with meals.  Please return to the ED tomorrow to be evaluated.  Please follow-up with your primary care provider by calling to schedule an appointment.  Attached is your listed primary care provider and our computer.  If you would like to be established with another 1 also attached is contact information to be established with PCP here at [facility].  Please return to the ED with any new or worsening symptoms including fever, nausea, vomiting, numbness or tingling of the foot, redness or pain traveling up the leg, or any other concerns.